# Patient Record
Sex: FEMALE | Race: WHITE
[De-identification: names, ages, dates, MRNs, and addresses within clinical notes are randomized per-mention and may not be internally consistent; named-entity substitution may affect disease eponyms.]

---

## 2018-06-13 ENCOUNTER — HOSPITAL ENCOUNTER (EMERGENCY)
Dept: HOSPITAL 53 - M ED | Age: 38
Discharge: HOME | End: 2018-06-13
Payer: COMMERCIAL

## 2018-06-13 DIAGNOSIS — R06.4: ICD-10-CM

## 2018-06-13 DIAGNOSIS — R94.31: ICD-10-CM

## 2018-06-13 DIAGNOSIS — Z91.89: ICD-10-CM

## 2018-06-13 DIAGNOSIS — Z88.0: ICD-10-CM

## 2018-06-13 DIAGNOSIS — R00.0: ICD-10-CM

## 2018-06-13 DIAGNOSIS — R06.02: Primary | ICD-10-CM

## 2018-06-13 DIAGNOSIS — J45.909: ICD-10-CM

## 2018-06-13 DIAGNOSIS — Z79.899: ICD-10-CM

## 2018-06-13 DIAGNOSIS — J30.2: ICD-10-CM

## 2018-06-13 LAB
ANION GAP: 7 MEQ/L (ref 8–16)
BASO #: 0 10^3/UL (ref 0–0.2)
BASO %: 0.5 % (ref 0–1)
BLOOD UREA NITROGEN: 10 MG/DL (ref 7–18)
CALCIUM LEVEL: 9.4 MG/DL (ref 8.5–10.1)
CARBON DIOXIDE LEVEL: 30 MEQ/L (ref 21–32)
CHLORIDE LEVEL: 108 MEQ/L (ref 98–107)
CONTROL LINE HCG: (no result)
CREATININE FOR GFR: 0.97 MG/DL (ref 0.55–1.3)
D-DIMER QUANT: 422.3 NG/ML (ref ?–500)
EOS #: 0.2 10^3/UL (ref 0–0.5)
EOSINOPHIL NFR BLD AUTO: 2.3 % (ref 0–3)
GFR SERPL CREATININE-BSD FRML MDRD: > 60 ML/MIN/{1.73_M2} (ref 60–?)
GLUCOSE, FASTING: 102 MG/DL (ref 70–100)
HCG, SERUM QUALITATIVE: NEGATIVE
HEMATOCRIT: 39.1 % (ref 36–47)
HEMOGLOBIN: 12.8 G/DL (ref 12–15.5)
IMMATURE GRANULOCYTE %: 0.5 % (ref 0–3)
LYMPH #: 2.2 10^3/UL (ref 1.5–4.5)
LYMPH %: 26.9 % (ref 24–44)
MEAN CORPUSCULAR HEMOGLOBIN: 29.4 PG (ref 27–33)
MEAN CORPUSCULAR HGB CONC: 32.7 G/DL (ref 32–36.5)
MEAN CORPUSCULAR VOLUME: 89.9 FL (ref 80–96)
MONO #: 0.7 10^3/UL (ref 0–0.8)
MONO %: 8.5 % (ref 0–5)
NEUTROPHILS #: 5 10^3/UL (ref 1.8–7.7)
NEUTROPHILS %: 61.3 % (ref 36–66)
NRBC BLD AUTO-RTO: 0 % (ref 0–0)
PLATELET COUNT, AUTOMATED: 288 10^3/UL (ref 150–450)
POTASSIUM SERUM: 3.9 MEQ/L (ref 3.5–5.1)
RED BLOOD COUNT: 4.35 10^6/UL (ref 4–5.4)
RED CELL DISTRIBUTION WIDTH: 13.3 % (ref 11.5–14.5)
SODIUM LEVEL: 145 MEQ/L (ref 136–145)
VENOUS BASE EXCESS: -6.1 (ref -2–2)
VENOUS HCO3: 23.7 MEQ/L (ref 23–27)
VENOUS O2 SATURATION: 55.1 % (ref 60–80)
VENOUS PARTIAL PRESSURE CO2: 67.4 MMHG (ref 38–50)
VENOUS PARTIAL PRESSURE O2: 36.5 MMHG (ref 30–50)
VENOUS PH: 7.16 UNITS (ref 7.33–7.43)
VENOUS STANDARD HCO3: 18.6 MEQ/L
VENOUS TOTAL CO2: 25.8 MEQ/L (ref 24–28)
WHITE BLOOD COUNT: 8.1 10^3/UL (ref 4–10)

## 2018-06-13 RX ADMIN — KETOROLAC TROMETHAMINE 1 MG: 30 INJECTION, SOLUTION INTRAMUSCULAR at 13:03

## 2018-06-13 RX ADMIN — IPRATROPIUM BROMIDE AND ALBUTEROL SULFATE 1 ML: .5; 3 SOLUTION RESPIRATORY (INHALATION) at 13:02

## 2021-06-29 ENCOUNTER — HOSPITAL ENCOUNTER (EMERGENCY)
Dept: HOSPITAL 53 - M ED | Age: 41
Discharge: HOME | End: 2021-06-29
Payer: COMMERCIAL

## 2021-06-29 VITALS — HEIGHT: 66 IN | WEIGHT: 271.17 LBS | BODY MASS INDEX: 43.58 KG/M2

## 2021-06-29 VITALS — SYSTOLIC BLOOD PRESSURE: 128 MMHG | DIASTOLIC BLOOD PRESSURE: 58 MMHG

## 2021-06-29 DIAGNOSIS — Z79.899: ICD-10-CM

## 2021-06-29 DIAGNOSIS — J45.909: ICD-10-CM

## 2021-06-29 DIAGNOSIS — Z79.51: ICD-10-CM

## 2021-06-29 DIAGNOSIS — Z88.0: ICD-10-CM

## 2021-06-29 DIAGNOSIS — F41.9: ICD-10-CM

## 2021-06-29 DIAGNOSIS — Z91.048: ICD-10-CM

## 2021-06-29 DIAGNOSIS — F43.0: ICD-10-CM

## 2021-06-29 DIAGNOSIS — R07.9: Primary | ICD-10-CM

## 2021-06-29 LAB
BASOPHILS # BLD AUTO: 0 10^3/UL (ref 0–0.2)
BASOPHILS NFR BLD AUTO: 0.5 % (ref 0–1)
BUN SERPL-MCNC: 10 MG/DL (ref 7–18)
CALCIUM SERPL-MCNC: 9.3 MG/DL (ref 8.5–10.1)
CHLORIDE SERPL-SCNC: 107 MEQ/L (ref 98–107)
CK MB CFR.DF SERPL CALC: 1.45
CK MB SERPL-MCNC: < 1 NG/ML (ref ?–3.6)
CK SERPL-CCNC: 69 U/L (ref 26–192)
CO2 SERPL-SCNC: 26 MEQ/L (ref 21–32)
CREAT SERPL-MCNC: 0.9 MG/DL (ref 0.55–1.3)
EOSINOPHIL # BLD AUTO: 0.1 10^3/UL (ref 0–0.5)
EOSINOPHIL NFR BLD AUTO: 1.5 % (ref 0–3)
GFR SERPL CREATININE-BSD FRML MDRD: > 60 ML/MIN/{1.73_M2} (ref 60–?)
GLUCOSE SERPL-MCNC: 74 MG/DL (ref 70–100)
HCT VFR BLD AUTO: 42.4 % (ref 36–47)
HGB BLD-MCNC: 14 G/DL (ref 12–15.5)
LYMPHOCYTES # BLD AUTO: 2 10^3/UL (ref 1.5–5)
LYMPHOCYTES NFR BLD AUTO: 24.4 % (ref 24–44)
MCH RBC QN AUTO: 29.9 PG (ref 27–33)
MCHC RBC AUTO-ENTMCNC: 33 G/DL (ref 32–36.5)
MCV RBC AUTO: 90.6 FL (ref 80–96)
MONOCYTES # BLD AUTO: 0.6 10^3/UL (ref 0–0.8)
MONOCYTES NFR BLD AUTO: 7.2 % (ref 2–8)
NEUTROPHILS # BLD AUTO: 5.4 10^3/UL (ref 1.5–8.5)
NEUTROPHILS NFR BLD AUTO: 66 % (ref 36–66)
PLATELET # BLD AUTO: 254 10^3/UL (ref 150–450)
POTASSIUM SERPL-SCNC: 4.4 MEQ/L (ref 3.5–5.1)
RBC # BLD AUTO: 4.68 10^6/UL (ref 4–5.4)
SODIUM SERPL-SCNC: 140 MEQ/L (ref 136–145)
TROPONIN I SERPL-MCNC: < 0.02 NG/ML (ref ?–0.1)
WBC # BLD AUTO: 8.2 10^3/UL (ref 4–10)

## 2021-06-29 NOTE — REP
INDICATION:

CHEST PAIN



COMPARISON:

04/20/2016



TECHNIQUE:

Portable AP view of the chest



FINDINGS:

The mediastinum and cardiac silhouette are stable and within normal limits for

portable technique.  The lung fields are clear without acute consolidation, effusion,

or pneumothorax.  Skeletal structures are intact.



IMPRESSION:

No acute cardiopulmonary process appreciated.





<Electronically signed by Malcolm Batres > 06/29/21 [id number]

## 2021-06-30 NOTE — ECGEPIP
[facility] - ED

                                       

                                       Test Date:    2021

Pat Name:     NYLA MARQUES            Department:   

Patient ID:   [id number]                 Room:         -

Gender:       Female                   Technician:   STEVEN

:          1980               Requested By: KAIDEN MENDEZ

Order Number: [id number]     Reading MD:   Zulay Salazar

                                 Measurements

Intervals                              Axis          

Rate:         78                       P:            16

MA:           170                      QRS:          4

QRSD:         80                       T:            33

QT:           388                                    

QTc:          442                                    

                           Interpretive Statements

Sinus rhythm with occasional premature ventricular complexes

decreased rate 18

Electronically Signed on 2021 10:58:36 EDT by Zulay Salazar

## 2021-07-01 NOTE — ECGEPIP
[facility] - ED

                                       

                                       Test Date:    2021

Pat Name:     NYAL MARQUES            Department:   

Patient ID:   [id number]                 Room:         -

Gender:       Female                   Technician:   GERMAN

:          1980               Requested By: KAIDEN MENDEZ

Order Number: [id number]     Reading MD:   Zulay Salazar

                                 Measurements

Intervals                              Axis          

Rate:         94                       P:            44

SC:           158                      QRS:          10

QRSD:         74                       T:            59

QT:           374                                    

QTc:          467                                    

                           Interpretive Statements

Sinus rhythm with frequent and consecutive premature ventricular complexes

NSTTW abnormalities

increased rate 21

Electronically Signed on 2021 17:50:43 EDT by Zulay Salazar

## 2022-03-18 ENCOUNTER — HOSPITAL ENCOUNTER (EMERGENCY)
Dept: HOSPITAL 53 - M ED | Age: 42
Discharge: HOME | End: 2022-03-18
Payer: COMMERCIAL

## 2022-03-18 VITALS — WEIGHT: 250.53 LBS | BODY MASS INDEX: 39.32 KG/M2 | HEIGHT: 67 IN

## 2022-03-18 VITALS — SYSTOLIC BLOOD PRESSURE: 114 MMHG | DIASTOLIC BLOOD PRESSURE: 90 MMHG

## 2022-03-18 DIAGNOSIS — Z79.899: ICD-10-CM

## 2022-03-18 DIAGNOSIS — F32.A: ICD-10-CM

## 2022-03-18 DIAGNOSIS — R07.9: Primary | ICD-10-CM

## 2022-03-18 DIAGNOSIS — J45.909: ICD-10-CM

## 2022-03-18 DIAGNOSIS — Z79.51: ICD-10-CM

## 2022-03-18 LAB
ALBUMIN SERPL BCG-MCNC: 3.9 GM/DL (ref 3.2–5.2)
ALT SERPL W P-5'-P-CCNC: 29 U/L (ref 12–78)
APTT BLD: 28.7 SECONDS (ref 25.9–37)
B-HCG SERPL QL: NEGATIVE
BASOPHILS # BLD AUTO: 0 10^3/UL (ref 0–0.2)
BASOPHILS NFR BLD AUTO: 0.4 % (ref 0–1)
BILIRUB CONJ SERPL-MCNC: < 0.1 MG/DL (ref 0–0.2)
BILIRUB SERPL-MCNC: 0.8 MG/DL (ref 0.2–1)
BUN SERPL-MCNC: 11 MG/DL (ref 7–18)
CALCIUM SERPL-MCNC: 9.1 MG/DL (ref 8.5–10.1)
CHLORIDE SERPL-SCNC: 107 MEQ/L (ref 98–107)
CK MB CFR.DF SERPL CALC: 1.25
CK MB CFR.DF SERPL CALC: 1.45
CK MB SERPL-MCNC: < 1 NG/ML (ref ?–3.6)
CK MB SERPL-MCNC: < 1 NG/ML (ref ?–3.6)
CK SERPL-CCNC: 69 U/L (ref 26–192)
CK SERPL-CCNC: 80 U/L (ref 26–192)
CO2 SERPL-SCNC: 26 MEQ/L (ref 21–32)
CREAT SERPL-MCNC: 0.92 MG/DL (ref 0.55–1.3)
EOSINOPHIL # BLD AUTO: 0.1 10^3/UL (ref 0–0.5)
EOSINOPHIL NFR BLD AUTO: 1.4 % (ref 0–3)
GFR SERPL CREATININE-BSD FRML MDRD: > 60 ML/MIN/{1.73_M2} (ref 58–?)
GLUCOSE SERPL-MCNC: 80 MG/DL (ref 70–100)
HCT VFR BLD AUTO: 41.3 % (ref 36–47)
HGB BLD-MCNC: 13.9 G/DL (ref 12–15.5)
INR PPP: 0.97
LYMPHOCYTES # BLD AUTO: 2.7 10^3/UL (ref 1.5–5)
LYMPHOCYTES NFR BLD AUTO: 29.6 % (ref 24–44)
MCH RBC QN AUTO: 30.3 PG (ref 27–33)
MCHC RBC AUTO-ENTMCNC: 33.7 G/DL (ref 32–36.5)
MCV RBC AUTO: 90.2 FL (ref 80–96)
MONOCYTES # BLD AUTO: 0.6 10^3/UL (ref 0–0.8)
MONOCYTES NFR BLD AUTO: 6.7 % (ref 2–8)
NEUTROPHILS # BLD AUTO: 5.6 10^3/UL (ref 1.5–8.5)
NEUTROPHILS NFR BLD AUTO: 61.6 % (ref 36–66)
PLATELET # BLD AUTO: 296 10^3/UL (ref 150–450)
POTASSIUM SERPL-SCNC: 4.9 MEQ/L (ref 3.5–5.1)
PROT SERPL-MCNC: 7.3 GM/DL (ref 6.4–8.2)
PROTHROMBIN TIME: 13.3 SECONDS (ref 12.7–14.5)
RBC # BLD AUTO: 4.58 10^6/UL (ref 4–5.4)
SODIUM SERPL-SCNC: 137 MEQ/L (ref 136–145)
WBC # BLD AUTO: 9 10^3/UL (ref 4–10)

## 2022-03-18 PROCEDURE — 94760 N-INVAS EAR/PLS OXIMETRY 1: CPT

## 2022-03-18 PROCEDURE — 85025 COMPLETE CBC W/AUTO DIFF WBC: CPT

## 2022-03-18 PROCEDURE — 80076 HEPATIC FUNCTION PANEL: CPT

## 2022-03-18 PROCEDURE — 93971 EXTREMITY STUDY: CPT

## 2022-03-18 PROCEDURE — 80048 BASIC METABOLIC PNL TOTAL CA: CPT

## 2022-03-18 PROCEDURE — 82553 CREATINE MB FRACTION: CPT

## 2022-03-18 PROCEDURE — 71275 CT ANGIOGRAPHY CHEST: CPT

## 2022-03-18 PROCEDURE — 71045 X-RAY EXAM CHEST 1 VIEW: CPT

## 2022-03-18 PROCEDURE — 99285 EMERGENCY DEPT VISIT HI MDM: CPT

## 2022-03-18 PROCEDURE — 85730 THROMBOPLASTIN TIME PARTIAL: CPT

## 2022-03-18 PROCEDURE — 84703 CHORIONIC GONADOTROPIN ASSAY: CPT

## 2022-03-18 PROCEDURE — 85610 PROTHROMBIN TIME: CPT

## 2022-03-18 PROCEDURE — 93041 RHYTHM ECG TRACING: CPT

## 2022-03-18 PROCEDURE — 93005 ELECTROCARDIOGRAM TRACING: CPT

## 2022-03-18 PROCEDURE — 96374 THER/PROPH/DIAG INJ IV PUSH: CPT

## 2022-03-18 PROCEDURE — 82550 ASSAY OF CK (CPK): CPT
